# Patient Record
Sex: FEMALE
[De-identification: names, ages, dates, MRNs, and addresses within clinical notes are randomized per-mention and may not be internally consistent; named-entity substitution may affect disease eponyms.]

---

## 2017-03-08 PROBLEM — Z00.00 ENCOUNTER FOR PREVENTIVE HEALTH EXAMINATION: Status: ACTIVE | Noted: 2017-03-08

## 2021-04-07 ENCOUNTER — TRANSCRIPTION ENCOUNTER (OUTPATIENT)
Age: 36
End: 2021-04-07

## 2021-12-01 ENCOUNTER — TRANSCRIPTION ENCOUNTER (OUTPATIENT)
Age: 36
End: 2021-12-01

## 2022-05-09 ENCOUNTER — NON-APPOINTMENT (OUTPATIENT)
Age: 37
End: 2022-05-09

## 2022-09-18 ENCOUNTER — NON-APPOINTMENT (OUTPATIENT)
Age: 37
End: 2022-09-18

## 2022-10-29 ENCOUNTER — NON-APPOINTMENT (OUTPATIENT)
Age: 37
End: 2022-10-29

## 2024-08-03 ENCOUNTER — APPOINTMENT (OUTPATIENT)
Dept: ORTHOPEDIC SURGERY | Facility: CLINIC | Age: 39
End: 2024-08-03
Payer: COMMERCIAL

## 2024-08-03 VITALS — WEIGHT: 180 LBS | BODY MASS INDEX: 28.25 KG/M2 | HEIGHT: 67 IN

## 2024-08-03 DIAGNOSIS — M25.852 OTHER SPECIFIED JOINT DISORDERS, LEFT HIP: ICD-10-CM

## 2024-08-03 DIAGNOSIS — Z78.9 OTHER SPECIFIED HEALTH STATUS: ICD-10-CM

## 2024-08-03 DIAGNOSIS — M25.552 PAIN IN LEFT HIP: ICD-10-CM

## 2024-08-03 PROCEDURE — 73502 X-RAY EXAM HIP UNI 2-3 VIEWS: CPT

## 2024-08-03 PROCEDURE — 99203 OFFICE O/P NEW LOW 30 MIN: CPT

## 2024-08-03 NOTE — HISTORY OF PRESENT ILLNESS
[6] : 6 [Dull/Aching] : dull/aching [Localized] : localized [Sharp] : sharp [Intermittent] : intermittent [Standing] : standing [Walking] : walking [Exercising] : exercising [Stairs] : stairs [Full time] : Work status: full time

## 2024-08-03 NOTE — PHYSICAL EXAM
[Able to Communicate] : able to communicate [Well Developed] : well developed [Well Nourished] : well nourished [Left] : left hip [Hip extension] : hip extension [] : patient ambulates without assistive device

## 2024-08-07 ENCOUNTER — APPOINTMENT (OUTPATIENT)
Dept: MRI IMAGING | Facility: CLINIC | Age: 39
End: 2024-08-07

## 2024-08-07 PROCEDURE — 73721 MRI JNT OF LWR EXTRE W/O DYE: CPT | Mod: LT

## 2024-08-19 ENCOUNTER — APPOINTMENT (OUTPATIENT)
Dept: ORTHOPEDIC SURGERY | Facility: CLINIC | Age: 39
End: 2024-08-19
Payer: COMMERCIAL

## 2024-08-19 VITALS — HEIGHT: 67 IN | WEIGHT: 187 LBS | BODY MASS INDEX: 29.35 KG/M2

## 2024-08-19 DIAGNOSIS — M70.72 OTHER BURSITIS OF HIP, LEFT HIP: ICD-10-CM

## 2024-08-19 DIAGNOSIS — M25.852 OTHER SPECIFIED JOINT DISORDERS, LEFT HIP: ICD-10-CM

## 2024-08-19 PROCEDURE — 99203 OFFICE O/P NEW LOW 30 MIN: CPT

## 2024-08-20 NOTE — IMAGING
[de-identified] : Constitutional: Well developed, well nourished, able to communicate Neuro: Normal sensation, No focal deficits Skin: Intact CV: Peripheral vascular exam grossly normal Pulm: No signs of respiratory distress Psych: Oriented, normal mood and affect

## 2024-08-20 NOTE — IMAGING
[de-identified] : Constitutional: Well developed, well nourished, able to communicate Neuro: Normal sensation, No focal deficits Skin: Intact CV: Peripheral vascular exam grossly normal Pulm: No signs of respiratory distress Psych: Oriented, normal mood and affect

## 2024-08-20 NOTE — ASSESSMENT
[FreeTextEntry1] : Follow-up of left intermittent hip pain that she has had over the last month or so without injury.  Went to urgent care and obtained MRI showing ischiofemoral bursitis, mild left hip degenerative changes.  Seems to have pain with hip impingement positions.  Not currently exercising but was doing [employer].  Did not start physical therapy - Start physical therapy - Exercise modifications discussed - Tylenol, ice, NSAIDs as needed - Follow-up in 4 to 6 weeks for reexam, consider ischial bursa corticosteroid injection if needed

## 2024-08-20 NOTE — HISTORY OF PRESENT ILLNESS
[de-identified] : 08/19/2024:Patient is here to review MRI results. She has done nothing to treat her pain at this time. Denies numbness and tingling. Over the past week she noticed some discomfort on her right side. Symptoms come and go. Does [employer] over last year. Pain worse with prolonged walking and with putting a shoe on. Works as a PA.   NEELAM Howell Note 8/3/24: 39 y/o F presents with progressively worsening pain and symptoms LEFT anterior hip and groin. She limps now after prolonged standing, ambulation and work shift. She has difficulty balancing on her L leg to put her shorts or pants on. Decreased ability to exercise. Sensation of stiffness and tightness with motion, bending forward and hip rotation has increased since onset. She describes that the pain feels "deep". No specific injury recalled or prior hx hip issues. She is not experiencing lower back pain.

## 2024-08-20 NOTE — HISTORY OF PRESENT ILLNESS
[de-identified] : 08/19/2024:Patient is here to review MRI results. She has done nothing to treat her pain at this time. Denies numbness and tingling. Over the past week she noticed some discomfort on her right side. Symptoms come and go. Does [employer] over last year. Pain worse with prolonged walking and with putting a shoe on. Works as a PA.   NEELAM Howell Note 8/3/24: 39 y/o F presents with progressively worsening pain and symptoms LEFT anterior hip and groin. She limps now after prolonged standing, ambulation and work shift. She has difficulty balancing on her L leg to put her shorts or pants on. Decreased ability to exercise. Sensation of stiffness and tightness with motion, bending forward and hip rotation has increased since onset. She describes that the pain feels "deep". No specific injury recalled or prior hx hip issues. She is not experiencing lower back pain.

## 2024-09-16 ENCOUNTER — APPOINTMENT (OUTPATIENT)
Dept: ORTHOPEDIC SURGERY | Facility: CLINIC | Age: 39
End: 2024-09-16

## 2024-09-23 ENCOUNTER — NON-APPOINTMENT (OUTPATIENT)
Age: 39
End: 2024-09-23